# Patient Record
Sex: MALE | Race: WHITE | NOT HISPANIC OR LATINO | Employment: UNEMPLOYED | URBAN - METROPOLITAN AREA
[De-identification: names, ages, dates, MRNs, and addresses within clinical notes are randomized per-mention and may not be internally consistent; named-entity substitution may affect disease eponyms.]

---

## 2024-01-09 ENCOUNTER — HOSPITAL ENCOUNTER (OUTPATIENT)
Dept: RADIOLOGY | Facility: HOSPITAL | Age: 59
Discharge: HOME | End: 2024-01-09
Payer: MEDICARE

## 2024-01-09 DIAGNOSIS — R97.20 ELEVATED PROSTATE SPECIFIC ANTIGEN (PSA): ICD-10-CM

## 2024-01-09 PROCEDURE — A9575 INJ GADOTERATE MEGLUMI 0.1ML: HCPCS | Performed by: UROLOGY

## 2024-01-09 PROCEDURE — 2550000001 HC RX 255 CONTRASTS: Performed by: UROLOGY

## 2024-01-09 PROCEDURE — 72197 MRI PELVIS W/O & W/DYE: CPT

## 2024-01-09 RX ORDER — GADOTERATE MEGLUMINE 376.9 MG/ML
20 INJECTION INTRAVENOUS
Status: COMPLETED | OUTPATIENT
Start: 2024-01-09 | End: 2024-01-09

## 2024-01-09 RX ADMIN — GADOTERATE MEGLUMINE 20 ML: 376.9 INJECTION INTRAVENOUS at 13:24

## 2024-02-02 ENCOUNTER — NURSING HOME VISIT (OUTPATIENT)
Dept: POST ACUTE CARE | Facility: EXTERNAL LOCATION | Age: 59
End: 2024-02-02
Payer: MEDICARE

## 2024-02-02 DIAGNOSIS — E11.9 COMPREHENSIVE DIABETIC FOOT EXAMINATION, TYPE 2 DM, ENCOUNTER FOR (MULTI): Primary | ICD-10-CM

## 2024-02-02 DIAGNOSIS — I73.9 PVD (PERIPHERAL VASCULAR DISEASE) (CMS-HCC): ICD-10-CM

## 2024-02-02 DIAGNOSIS — M79.671 FOOT PAIN, BILATERAL: ICD-10-CM

## 2024-02-02 DIAGNOSIS — M79.672 FOOT PAIN, BILATERAL: ICD-10-CM

## 2024-02-02 PROCEDURE — 99307 SBSQ NF CARE SF MDM 10: CPT | Performed by: PODIATRIST

## 2024-02-02 NOTE — LETTER
Patient: Jaret Turcios  : 1965    Encounter Date: 2024    Subjective: Patient complains of painful nails and unable to safely trim on their own.   Objective: Vascular exam: DP and PT pulses palpable 2/4 b/l.   Capillary refill time less than 3 seconds b/l. + Hair growth noted to digits. No lower extremity swelling noted to b/l legs.   Skin temp warm to warm from proximal to distal b/l.    No Varicosities noted to feet b/l.   Neurologic: Vibratory, protective and proprioception intact b/l.     Musculoskeletal exam: Muscle strength 5/5 for all major muscle groups tested in the lower extremity b/l.     Dermatologic exam: Nails 1-5 b/l are elongated and discolored with the presence of subungual debris. Left 5th nail notes sub ungula - debrided nail and area  Webspaces 1-4 b/l are non macerated.   Hyperkeratotic tissue noted:None  Open wounds noted: Scattered nodules noted to b/l lower extremity, erythema to right dorsal shin. No drainage noted.   Assessment: DM type II, fungal nails, PVD, foot pain  Plan:  Patient examined and evaluated.   Nails debrided - L 5nd nail debrided. Mild erythema noted. No SOI . Continue to monitor  Betadine to area, cotton ball to offload area daily.   Will continue to follow. Call if any concerns.  All questions answered.  Continue current orders      Electronically Signed By: Shannan Lawton DPM   24  1:20 PM

## 2024-02-14 ENCOUNTER — PRE-ADMISSION TESTING (OUTPATIENT)
Dept: PREADMISSION TESTING | Facility: HOSPITAL | Age: 59
End: 2024-02-14
Payer: MEDICARE

## 2024-02-14 ENCOUNTER — LAB (OUTPATIENT)
Dept: LAB | Facility: LAB | Age: 59
End: 2024-02-14
Payer: MEDICARE

## 2024-02-14 VITALS
BODY MASS INDEX: 30.8 KG/M2 | HEIGHT: 74 IN | DIASTOLIC BLOOD PRESSURE: 74 MMHG | TEMPERATURE: 96.6 F | RESPIRATION RATE: 16 BRPM | SYSTOLIC BLOOD PRESSURE: 147 MMHG | WEIGHT: 240 LBS | OXYGEN SATURATION: 99 % | HEART RATE: 65 BPM

## 2024-02-14 DIAGNOSIS — Z01.818 PREOPERATIVE TESTING: Primary | ICD-10-CM

## 2024-02-14 DIAGNOSIS — Z01.818 PREOPERATIVE TESTING: ICD-10-CM

## 2024-02-14 LAB
ANION GAP SERPL CALC-SCNC: 10 MMOL/L (ref 10–20)
BUN SERPL-MCNC: 17 MG/DL (ref 6–23)
CALCIUM SERPL-MCNC: 9.6 MG/DL (ref 8.6–10.3)
CHLORIDE SERPL-SCNC: 101 MMOL/L (ref 98–107)
CO2 SERPL-SCNC: 32 MMOL/L (ref 21–32)
CREAT SERPL-MCNC: 1.03 MG/DL (ref 0.5–1.3)
EGFRCR SERPLBLD CKD-EPI 2021: 84 ML/MIN/1.73M*2
ERYTHROCYTE [DISTWIDTH] IN BLOOD BY AUTOMATED COUNT: 12 % (ref 11.5–14.5)
GLUCOSE SERPL-MCNC: 88 MG/DL (ref 74–99)
HCT VFR BLD AUTO: 48.2 % (ref 41–52)
HGB BLD-MCNC: 16.3 G/DL (ref 13.5–17.5)
MCH RBC QN AUTO: 31.8 PG (ref 26–34)
MCHC RBC AUTO-ENTMCNC: 33.8 G/DL (ref 32–36)
MCV RBC AUTO: 94 FL (ref 80–100)
NRBC BLD-RTO: 0 /100 WBCS (ref 0–0)
PLATELET # BLD AUTO: 222 X10*3/UL (ref 150–450)
POTASSIUM SERPL-SCNC: 4.1 MMOL/L (ref 3.5–5.3)
RBC # BLD AUTO: 5.12 X10*6/UL (ref 4.5–5.9)
SODIUM SERPL-SCNC: 139 MMOL/L (ref 136–145)
WBC # BLD AUTO: 7.2 X10*3/UL (ref 4.4–11.3)

## 2024-02-14 PROCEDURE — 93005 ELECTROCARDIOGRAM TRACING: CPT

## 2024-02-14 PROCEDURE — 80048 BASIC METABOLIC PNL TOTAL CA: CPT

## 2024-02-14 PROCEDURE — 93010 ELECTROCARDIOGRAM REPORT: CPT | Performed by: INTERNAL MEDICINE

## 2024-02-14 PROCEDURE — 99203 OFFICE O/P NEW LOW 30 MIN: CPT | Performed by: NURSE PRACTITIONER

## 2024-02-14 PROCEDURE — 85027 COMPLETE CBC AUTOMATED: CPT

## 2024-02-14 RX ORDER — ONDANSETRON 4 MG/1
4 TABLET, FILM COATED ORAL 4 TIMES DAILY PRN
COMMUNITY

## 2024-02-14 RX ORDER — BISACODYL 10 MG/1
10 SUPPOSITORY RECTAL DAILY PRN
COMMUNITY

## 2024-02-14 RX ORDER — GLYBURIDE-METFORMIN HYDROCHLORIDE 5; 500 MG/1; MG/1
2 TABLET ORAL EVERY EVENING
COMMUNITY
Start: 2023-05-31

## 2024-02-14 RX ORDER — ADHESIVE BANDAGE
30 BANDAGE TOPICAL DAILY PRN
COMMUNITY

## 2024-02-14 RX ORDER — DIPHENHYDRAMINE HCL 25 MG
25 CAPSULE ORAL EVERY 6 HOURS PRN
COMMUNITY

## 2024-02-14 RX ORDER — GLYBURIDE-METFORMIN HYDROCHLORIDE 5; 500 MG/1; MG/1
1 TABLET ORAL
COMMUNITY

## 2024-02-14 RX ORDER — ACETAMINOPHEN 160 MG/5ML
20 LIQUID ORAL EVERY 6 HOURS PRN
COMMUNITY

## 2024-02-14 RX ORDER — PHENOL 1.4 %
1 AEROSOL, SPRAY (ML) MUCOUS MEMBRANE EVERY 2 HOUR PRN
COMMUNITY

## 2024-02-14 RX ORDER — ACETAMINOPHEN 500 MG
1000 TABLET ORAL EVERY 6 HOURS PRN
COMMUNITY

## 2024-02-14 RX ORDER — MENTHOL AND ZINC OXIDE .44; 20.625 G/100G; G/100G
1 OINTMENT TOPICAL 4 TIMES DAILY PRN
COMMUNITY

## 2024-02-14 RX ORDER — LOPERAMIDE HCL 2 MG
4 TABLET ORAL 4 TIMES DAILY PRN
COMMUNITY

## 2024-02-14 RX ORDER — MIRABEGRON 50 MG/1
50 TABLET, EXTENDED RELEASE ORAL DAILY
COMMUNITY

## 2024-02-14 ASSESSMENT — DUKE ACTIVITY SCORE INDEX (DASI)
CAN YOU TAKE CARE OF YOURSELF (EAT, DRESS, BATHE, OR USE TOILET): YES
CAN YOU RUN A SHORT DISTANCE: YES
CAN YOU WALK INDOORS, SUCH AS AROUND YOUR HOUSE: YES
CAN YOU DO HEAVY WORK AROUND THE HOUSE LIKE SCRUBBING FLOORS OR LIFTING AND MOVING HEAVY FURNITURE: YES
CAN YOU DO LIGHT WORK AROUND THE HOUSE LIKE DUSTING OR WASHING DISHES: YES
CAN YOU DO YARD WORK LIKE RAKING LEAVES, WEEDING OR PUSHING A MOWER: YES
CAN YOU PARTICIPATE IN STRENOUS SPORTS LIKE SWIMMING, SINGLES TENNIS, FOOTBALL, BASKETBALL, OR SKIING: NO
CAN YOU CLIMB A FLIGHT OF STAIRS OR WALK UP A HILL: YES
CAN YOU DO MODERATE WORK AROUND THE HOUSE LIKE VACUUMING, SWEEPING FLOORS OR CARRYING GROCERIES: YES
CAN YOU HAVE SEXUAL RELATIONS: NO
TOTAL_SCORE: 45.45
CAN YOU PARTICIPATE IN MODERATE RECREATIONAL ACTIVITIES LIKE GOLF, BOWLING, DANCING, DOUBLES TENNIS OR THROWING A BASEBALL OR FOOTBALL: YES
CAN YOU WALK A BLOCK OR TWO ON LEVEL GROUND: YES
DASI METS SCORE: 8.3

## 2024-02-14 ASSESSMENT — ENCOUNTER SYMPTOMS
CARDIOVASCULAR NEGATIVE: 1
EYES NEGATIVE: 1
RESPIRATORY NEGATIVE: 1
MUSCULOSKELETAL NEGATIVE: 1
ENDOCRINE NEGATIVE: 1
CONSTITUTIONAL NEGATIVE: 1
GASTROINTESTINAL NEGATIVE: 1
FREQUENCY: 1
PSYCHIATRIC NEGATIVE: 1
TREMORS: 1

## 2024-02-14 ASSESSMENT — PAIN - FUNCTIONAL ASSESSMENT: PAIN_FUNCTIONAL_ASSESSMENT: 0-10

## 2024-02-14 ASSESSMENT — CHADS2 SCORE
AGE GREATER THAN OR EQUAL TO 75: NO
PRIOR STROKE OR TIA OR THROMBOEMBOLISM: NO
HYPERTENSION: NO
CHF: NO
DIABETES: YES
CHADS2 SCORE: 1
AGE GREATER THAN OR EQUAL TO 75: NO

## 2024-02-14 ASSESSMENT — PAIN SCALES - GENERAL: PAINLEVEL_OUTOF10: 0 - NO PAIN

## 2024-02-14 NOTE — CPM/PAT H&P
CPM/PAT Evaluation       Name: Jaret Turcios (Jaret Turcios)  /Age: 10/29//58 y.o.     In-Person       Chief Complaint: ELEVATED PSA     HPI  A 58-year-old male with elevated PSA here with his caregiver nurse (Jodi). Patient has intellectual disability and currently resides at [facility] (POA) is brother Geoff.  Patients medical history significant for diabetes type 2, severe sleep apnea, urinary incontinence.  History of urinary incontinence, elevated PSA and recent abnormal prostate MRI.  Denies fever, chills, dysuria, or hematuria.  He is scheduled for prostate biopsy with navigation, transrectal prostate ultrasonography.    Past Medical History:   Diagnosis Date    Cataract     DM (diabetes mellitus) (CMS/[facility])     Intellectual disability     ISHAN (obstructive sleep apnea)     Urinary incontinence        Past Surgical History:   Procedure Laterality Date    CHOLECYSTECTOMY           No Known Allergies    Current Outpatient Medications   Medication Sig Dispense Refill    glyburide-metformin (Glucovance) 5-500 mg tablet Take 2 tablets by mouth once daily in the evening.      SITagliptin phosphate (Januvia) 50 mg tablet Take 1 tablet (50 mg) by mouth once daily.      acetaminophen (Tylenol) 160 mg/5 mL elixir Take 20 mL (640 mg) by mouth every 6 hours if needed for fever (temp greater than 38.0 C).      acetaminophen (Tylenol) 500 mg tablet Take 2 tablets (1,000 mg) by mouth every 6 hours if needed for mild pain (1 - 3).      aluminum-magnesium hydroxide 200-200 mg/5 mL suspension Take 20 mL by mouth every 6 hours if needed for heartburn.      bisacodyl (Laxative, bisacodyl,) 10 mg suppository Insert 1 suppository (10 mg) into the rectum once daily as needed for constipation.      diphenhydrAMINE (BENADryl) 25 mg capsule Take 1 capsule (25 mg) by mouth every 6 hours if needed for itching.      glyburide-metformin (Glucovance) 5-500 mg tablet Take 1 tablet by mouth once daily with breakfast.       guaifenesin/dextromethorphan (TUSSIN DM CLEAR ORAL) Take 10 mL by mouth 4 times a day as needed (COLD SYMPTOMS).      guaifenesin/DM/pseudoephedrine (TUSSIN CF ORAL) Take 10 mL by mouth 4 times a day as needed (COLD SYMPTOMS).      loperamide (Imodium A-D) 2 mg tablet Take 2 tablets (4 mg) by mouth 4 times a day as needed for diarrhea.      magnesium hydroxide (Ortega Milk of Magnesia) 400 mg/5 mL suspension Take 30 mL by mouth once daily as needed for constipation.      menthol-zinc oxide (Calmoseptine) 0.44-20.6 % ointment Apply 1 Application topically 4 times a day as needed for irritation.      mirabegron (Myrbetriq) 50 mg tablet extended release 24 hr 24 hr tablet Take 1 tablet (50 mg) by mouth once daily.      ondansetron (Zofran) 4 mg tablet Take 1 tablet (4 mg) by mouth 4 times a day as needed for nausea or vomiting.      phenoL (Chloraseptic Throat Spray) 1.4 % aerosol,spray Use 1 spray in the mouth or throat every 2 hours if needed for sore throat.      vit C/E/Zn/coppr/lutein/zeaxan (PRESERVISION AREDS-2 ORAL) Take 2 capsules by mouth 2 times a day.       No current facility-administered medications for this visit.       Review of Systems   Constitutional: Negative.    HENT: Negative.     Eyes: Negative.    Respiratory: Negative.     Cardiovascular: Negative.    Gastrointestinal: Negative.    Endocrine: Negative.    Genitourinary:  Positive for frequency.   Musculoskeletal: Negative.    Skin: Negative.    Neurological:  Positive for tremors.   Psychiatric/Behavioral: Negative.          Physical Exam  Vitals reviewed.   HENT:      Head: Normocephalic and atraumatic.      Ears:      Comments: Bilateral hearing aids     Mouth/Throat:      Mouth: Mucous membranes are moist.   Eyes:      Pupils: Pupils are equal, round, and reactive to light.      Comments: glasses   Cardiovascular:      Rate and Rhythm: Normal rate and regular rhythm.   Pulmonary:      Effort: Pulmonary effort is normal.      Breath sounds:  "Normal breath sounds.   Abdominal:      Palpations: Abdomen is soft.   Musculoskeletal:         General: Normal range of motion.      Cervical back: Normal range of motion.   Skin:     General: Skin is warm and dry.   Neurological:      Mental Status: He is alert and oriented to person, place, and time.      Comments: Intellectual disability   Psychiatric:         Mood and Affect: Mood normal.          PAT AIRWAY:   Airway:     Mallampati::  II    Neck ROM::  Full  normal        /74   Pulse 65   Temp 35.9 °C (96.6 °F) (Temporal)   Resp 16   Ht 1.88 m (6' 2\")   Wt 109 kg (240 lb)   SpO2 99%   BMI 30.81 kg/m²     Labs done 11/29/2023 BMP, Hgb A1c, psa  Labs ordered CBC, BMP      Stop Bang Score 4     CHADS 2 score: 2.8%  DASI score: 45.45  METS score: 8.3  Revised cardiac risk index: 0.4%  ASA II  ARISCAT 1.6%    Assessment and Plan:      ELEVATED PSA Plan: Prostate biopsy with navigation, transrectal prostate ultrasonography  Diabetes type 2 hemoglobin A1c 6.1 done 11/29/2023   Intellectual disability (POA) Brother Geoff  Urinary incontinence  Sleep apnea severe uses CPAP  BMI 30.81        "

## 2024-02-14 NOTE — H&P (VIEW-ONLY)
CPM/PAT Evaluation       Name: Jaret Turcios (Jaret Turcios)  /Age: 10/29//58 y.o.     In-Person       Chief Complaint: ELEVATED PSA     HPI  A 58-year-old male with elevated PSA here with his caregiver nurse (Jodi). Patient has intellectual disability and currently resides at [facility] (POA) is brother Geoff.  Patients medical history significant for diabetes type 2, severe sleep apnea, urinary incontinence.  History of urinary incontinence, elevated PSA and recent abnormal prostate MRI.  Denies fever, chills, dysuria, or hematuria.  He is scheduled for prostate biopsy with navigation, transrectal prostate ultrasonography.    Past Medical History:   Diagnosis Date    Cataract     DM (diabetes mellitus) (CMS/[facility])     Intellectual disability     ISHAN (obstructive sleep apnea)     Urinary incontinence        Past Surgical History:   Procedure Laterality Date    CHOLECYSTECTOMY           No Known Allergies    Current Outpatient Medications   Medication Sig Dispense Refill    glyburide-metformin (Glucovance) 5-500 mg tablet Take 2 tablets by mouth once daily in the evening.      SITagliptin phosphate (Januvia) 50 mg tablet Take 1 tablet (50 mg) by mouth once daily.      acetaminophen (Tylenol) 160 mg/5 mL elixir Take 20 mL (640 mg) by mouth every 6 hours if needed for fever (temp greater than 38.0 C).      acetaminophen (Tylenol) 500 mg tablet Take 2 tablets (1,000 mg) by mouth every 6 hours if needed for mild pain (1 - 3).      aluminum-magnesium hydroxide 200-200 mg/5 mL suspension Take 20 mL by mouth every 6 hours if needed for heartburn.      bisacodyl (Laxative, bisacodyl,) 10 mg suppository Insert 1 suppository (10 mg) into the rectum once daily as needed for constipation.      diphenhydrAMINE (BENADryl) 25 mg capsule Take 1 capsule (25 mg) by mouth every 6 hours if needed for itching.      glyburide-metformin (Glucovance) 5-500 mg tablet Take 1 tablet by mouth once daily with breakfast.       guaifenesin/dextromethorphan (TUSSIN DM CLEAR ORAL) Take 10 mL by mouth 4 times a day as needed (COLD SYMPTOMS).      guaifenesin/DM/pseudoephedrine (TUSSIN CF ORAL) Take 10 mL by mouth 4 times a day as needed (COLD SYMPTOMS).      loperamide (Imodium A-D) 2 mg tablet Take 2 tablets (4 mg) by mouth 4 times a day as needed for diarrhea.      magnesium hydroxide (Ortega Milk of Magnesia) 400 mg/5 mL suspension Take 30 mL by mouth once daily as needed for constipation.      menthol-zinc oxide (Calmoseptine) 0.44-20.6 % ointment Apply 1 Application topically 4 times a day as needed for irritation.      mirabegron (Myrbetriq) 50 mg tablet extended release 24 hr 24 hr tablet Take 1 tablet (50 mg) by mouth once daily.      ondansetron (Zofran) 4 mg tablet Take 1 tablet (4 mg) by mouth 4 times a day as needed for nausea or vomiting.      phenoL (Chloraseptic Throat Spray) 1.4 % aerosol,spray Use 1 spray in the mouth or throat every 2 hours if needed for sore throat.      vit C/E/Zn/coppr/lutein/zeaxan (PRESERVISION AREDS-2 ORAL) Take 2 capsules by mouth 2 times a day.       No current facility-administered medications for this visit.       Review of Systems   Constitutional: Negative.    HENT: Negative.     Eyes: Negative.    Respiratory: Negative.     Cardiovascular: Negative.    Gastrointestinal: Negative.    Endocrine: Negative.    Genitourinary:  Positive for frequency.   Musculoskeletal: Negative.    Skin: Negative.    Neurological:  Positive for tremors.   Psychiatric/Behavioral: Negative.          Physical Exam  Vitals reviewed.   HENT:      Head: Normocephalic and atraumatic.      Ears:      Comments: Bilateral hearing aids     Mouth/Throat:      Mouth: Mucous membranes are moist.   Eyes:      Pupils: Pupils are equal, round, and reactive to light.      Comments: glasses   Cardiovascular:      Rate and Rhythm: Normal rate and regular rhythm.   Pulmonary:      Effort: Pulmonary effort is normal.      Breath sounds:  "Normal breath sounds.   Abdominal:      Palpations: Abdomen is soft.   Musculoskeletal:         General: Normal range of motion.      Cervical back: Normal range of motion.   Skin:     General: Skin is warm and dry.   Neurological:      Mental Status: He is alert and oriented to person, place, and time.      Comments: Intellectual disability   Psychiatric:         Mood and Affect: Mood normal.          PAT AIRWAY:   Airway:     Mallampati::  II    Neck ROM::  Full  normal        /74   Pulse 65   Temp 35.9 °C (96.6 °F) (Temporal)   Resp 16   Ht 1.88 m (6' 2\")   Wt 109 kg (240 lb)   SpO2 99%   BMI 30.81 kg/m²     Labs done 11/29/2023 BMP, Hgb A1c, psa  Labs ordered CBC, BMP      Stop Bang Score 4     CHADS 2 score: 2.8%  DASI score: 45.45  METS score: 8.3  Revised cardiac risk index: 0.4%  ASA II  ARISCAT 1.6%    Assessment and Plan:      ELEVATED PSA Plan: Prostate biopsy with navigation, transrectal prostate ultrasonography  Diabetes type 2 hemoglobin A1c 6.1 done 11/29/2023   Intellectual disability (POA) Brother Geoff  Urinary incontinence  Sleep apnea severe uses CPAP  BMI 30.81        "

## 2024-02-14 NOTE — PREPROCEDURE INSTRUCTIONS
PAT DISCHARGE INSTRUCTIONS    Please call the Same Day Surgery (SDS) Department of the hospital where your procedure will be performed after 2:00 PM the day before your surgery. If you are scheduled on a Monday, or a Tuesday following a Monday holiday, you will need to call on the last business day prior to your surgery.    [facility]  [address], [address]  [phone]    [facility]  [address] [place] [address] [phone]    [facility]  [address]  [place] [id number]  [phone]    Please let your surgeon know if:      You develop any open sores, shingles, burning or painful urination as these may increase your risk of an infection.   You no longer wish to have the surgery.   Any other personal circumstances change that may lead to the need to cancel or defer this surgery-such as being sick or getting admitted to any hospital within one week of your planned procedure.    Your contact details change, such as a change of address or phone number.    Starting now:     Please DO NOT drink alcohol or smoke for 24 hours before surgery. It is well known that quitting smoking can make a huge difference to your health and recovery from surgery. The longer you abstain from smoking, the better your chances of a healthy recovery. If you need help with quitting, call 4-800-QUIT-NOW to be connected to a trained counselor who will discuss the best methods to help you quit.     Before your surgery:    Please stop all supplements 7 days prior to surgery. Or as directed by your surgeon.   Please stop taking NSAID pain medicine such as Advil and Motrin 7 days before surgery.    If you develop any fever, cough, cold, rashes, cuts, scratches, scrapes, urinary symptoms or infection anywhere on your body (including teeth and gums) prior to surgery, please call your surgeon’s office as soon  as possible. This may require treatment to reduce the chance of cancellation on the day of surgery.    The day before your surgery:   DIET- Do not eat or drink anything after midnight the night before surgery, including mints, candy and gum.   Get a good night’s rest.  Use the special soap for bathing if you have been instructed to use one.    Scheduled surgery times may change and you will be notified if this occurs - please check your personal voicemail for any updates.     On the morning of surgery:   Wear comfortable, loose fitting clothes which open in the front. Please do not wear moisturizers, creams, lotions, makeup or perfume.    Please bring with you to surgery:   Photo ID and insurance card   Current list of medicines and allergies   Pacemaker/ Defibrillator/Heart stent cards   CPAP machine and mask    Slings/ splints/ crutches   A copy of your complete advanced directive/DHPOA.    Please do NOT bring with you to surgery:   All jewelry and valuables should be left at home.   Prosthetic devices such as contact lenses, hearing aids, dentures, eyelash extensions, hairpins and body piercings must be removed prior to going in to the surgical suite.    After outpatient surgery:   A responsible adult MUST accompany you at the time of discharge and stay with you for 24 hours after your surgery. You may NOT drive yourself home after surgery.    Do not drive, operate machinery, make critical decisions or do activities that require co-ordination or balance until after a night’s sleep.   Do not drink alcoholic beverages for 24 hours.   Instructions for resuming your medications will be provided by your surgeon.    CALL YOUR DOCTOR AFTER SURGERY IF YOU HAVE:     Chills and/or a fever of 101° F or higher.    Redness, swelling, pus or drainage from your surgical wound or a bad smell from the wound.    Lightheadedness, fainting or confusion.    Persistent vomiting (throwing up) and are not able to eat or drink for 12  hours.    Three or more loose, watery bowel movements in 24 hours (diarrhea).   Difficulty or pain while urinating( after non-urological surgery)    Pain and swelling in your legs, especially if it is only on one side.    Difficulty breathing or are breathing faster than normal.    Any new concerning symptoms.     Medication List            Accurate as of February 14, 2024  2:29 PM. Always use your most recent med list.                * acetaminophen 500 mg tablet  Commonly known as: Tylenol  Notes to patient: Take as needed.     * acetaminophen 160 mg/5 mL elixir  Commonly known as: Tylenol  Notes to patient: Take as needed.     aluminum-magnesium hydroxide 200-200 mg/5 mL suspension  Notes to patient: Take as needed.     Calmoseptine 0.44-20.6 % ointment  Generic drug: menthol-zinc oxide  Notes to patient: Take as needed.     Chloraseptic Throat Spray 1.4 % aerosol,spray  Generic drug: phenoL  Notes to patient: Take as needed.     diphenhydrAMINE 25 mg capsule  Commonly known as: BENADryl  Notes to patient: Take as needed.     * glyburide-metformin 5-500 mg tablet  Commonly known as: Glucovance  Notes to patient: Hold dose day of surgery.     * glyburide-metformin 5-500 mg tablet  Commonly known as: Glucovance  Notes to patient: Hold night before surgery per anesthesia guidelines     Laxative (bisacodyl) 10 mg suppository  Generic drug: bisacodyl  Notes to patient: Take as needed.     loperamide 2 mg tablet  Commonly known as: Imodium A-D  Notes to patient: Take as needed.     mirabegron 50 mg tablet extended release 24 hr 24 hr tablet  Commonly known as: Myrbetriq Hold day of surgery     ondansetron 4 mg tablet  Commonly known as: Zofran  Medication Adjustments for Surgery:  As needed     Ortega Milk of Magnesia 400 mg/5 mL suspension  Generic drug: magnesium hydroxide  Notes to patient: Take as needed.     PRESERVISION AREDS-2 ORAL  Medication Adjustments for Surgery: Stop 7 days before surgery     SITagliptin  phosphate 50 mg tablet  Commonly known as: Zenuvia  Notes to patient: Hold night before and morning of surgery     TUSSIN CF ORAL  Notes to patient: Take as needed.     TUSSIN DM CLEAR ORAL  Notes to patient: Take as needed.           * This list has 4 medication(s) that are the same as other medications prescribed for you. Read the directions carefully, and ask your doctor or other care provider to review them with you.

## 2024-02-14 NOTE — PERIOPERATIVE NURSING NOTE
Patent seen in PAT today with Jodi from facility. Consent for treatment obtained by registration. Asked for Jodi to bring guardianship form day of surgery to scan to record. States that his guardian is bringing him that day. Med list and instructions reviewed with patient and Jodi.

## 2024-02-16 LAB
ATRIAL RATE: 61 BPM
P AXIS: 53 DEGREES
P OFFSET: 209 MS
P ONSET: 151 MS
PR INTERVAL: 144 MS
Q ONSET: 223 MS
QRS COUNT: 10 BEATS
QRS DURATION: 148 MS
QT INTERVAL: 442 MS
QTC CALCULATION(BAZETT): 444 MS
QTC FREDERICIA: 444 MS
R AXIS: -6 DEGREES
T AXIS: 29 DEGREES
T OFFSET: 444 MS
VENTRICULAR RATE: 61 BPM

## 2024-02-19 NOTE — PROGRESS NOTES
Subjective: Patient complains of painful nails and unable to safely trim on their own.   Objective: Vascular exam: DP and PT pulses palpable 2/4 b/l.   Capillary refill time less than 3 seconds b/l. + Hair growth noted to digits. No lower extremity swelling noted to b/l legs.   Skin temp warm to warm from proximal to distal b/l.    No Varicosities noted to feet b/l.   Neurologic: Vibratory, protective and proprioception intact b/l.     Musculoskeletal exam: Muscle strength 5/5 for all major muscle groups tested in the lower extremity b/l.     Dermatologic exam: Nails 1-5 b/l are elongated and discolored with the presence of subungual debris. Left 5th nail notes sub ungula - debrided nail and area  Webspaces 1-4 b/l are non macerated.   Hyperkeratotic tissue noted:None  Open wounds noted: Scattered nodules noted to b/l lower extremity, erythema to right dorsal shin. No drainage noted.   Assessment: DM type II, fungal nails, PVD, foot pain  Plan:  Patient examined and evaluated.   Nails debrided - L 5nd nail debrided. Mild erythema noted. No SOI . Continue to monitor  Betadine to area, cotton ball to offload area daily.   Will continue to follow. Call if any concerns.  All questions answered.  Continue current orders

## 2024-02-21 ENCOUNTER — ANESTHESIA EVENT (OUTPATIENT)
Dept: OPERATING ROOM | Facility: HOSPITAL | Age: 59
End: 2024-02-21
Payer: MEDICARE

## 2024-02-23 ENCOUNTER — ANESTHESIA (OUTPATIENT)
Dept: OPERATING ROOM | Facility: HOSPITAL | Age: 59
End: 2024-02-23
Payer: MEDICARE

## 2024-02-23 ENCOUNTER — HOSPITAL ENCOUNTER (OUTPATIENT)
Facility: HOSPITAL | Age: 59
Setting detail: OUTPATIENT SURGERY
Discharge: HOME | End: 2024-02-23
Attending: UROLOGY | Admitting: UROLOGY
Payer: MEDICARE

## 2024-02-23 VITALS
HEART RATE: 63 BPM | HEIGHT: 74 IN | BODY MASS INDEX: 30.27 KG/M2 | TEMPERATURE: 97.3 F | DIASTOLIC BLOOD PRESSURE: 89 MMHG | RESPIRATION RATE: 18 BRPM | SYSTOLIC BLOOD PRESSURE: 144 MMHG | WEIGHT: 235.89 LBS | OXYGEN SATURATION: 98 %

## 2024-02-23 DIAGNOSIS — R97.20 ELEVATED PSA: ICD-10-CM

## 2024-02-23 LAB — GLUCOSE BLD MANUAL STRIP-MCNC: 184 MG/DL (ref 74–99)

## 2024-02-23 PROCEDURE — 88344 IMHCHEM/IMCYTCHM EA MLT ANTB: CPT | Performed by: PATHOLOGY

## 2024-02-23 PROCEDURE — 7100000002 HC RECOVERY ROOM TIME - EACH INCREMENTAL 1 MINUTE: Performed by: UROLOGY

## 2024-02-23 PROCEDURE — A55700 PR BIOPSY OF PROSTATE,NEEDLE/PUNCH: Performed by: STUDENT IN AN ORGANIZED HEALTH CARE EDUCATION/TRAINING PROGRAM

## 2024-02-23 PROCEDURE — 2500000004 HC RX 250 GENERAL PHARMACY W/ HCPCS (ALT 636 FOR OP/ED): Performed by: UROLOGY

## 2024-02-23 PROCEDURE — 82947 ASSAY GLUCOSE BLOOD QUANT: CPT

## 2024-02-23 PROCEDURE — 3600000009 HC OR TIME - EACH INCREMENTAL 1 MINUTE - PROCEDURE LEVEL FOUR: Performed by: UROLOGY

## 2024-02-23 PROCEDURE — G0416 PROSTATE BIOPSY, ANY MTHD: HCPCS | Performed by: PATHOLOGY

## 2024-02-23 PROCEDURE — 7100000001 HC RECOVERY ROOM TIME - INITIAL BASE CHARGE: Performed by: UROLOGY

## 2024-02-23 PROCEDURE — 88344 IMHCHEM/IMCYTCHM EA MLT ANTB: CPT | Mod: TC,SUR,BEALAB,WESLAB,MUE | Performed by: UROLOGY

## 2024-02-23 PROCEDURE — 3700000002 HC GENERAL ANESTHESIA TIME - EACH INCREMENTAL 1 MINUTE: Performed by: UROLOGY

## 2024-02-23 PROCEDURE — 2500000004 HC RX 250 GENERAL PHARMACY W/ HCPCS (ALT 636 FOR OP/ED): Performed by: ANESTHESIOLOGIST ASSISTANT

## 2024-02-23 PROCEDURE — 3700000001 HC GENERAL ANESTHESIA TIME - INITIAL BASE CHARGE: Performed by: UROLOGY

## 2024-02-23 PROCEDURE — 3600000004 HC OR TIME - INITIAL BASE CHARGE - PROCEDURE LEVEL FOUR: Performed by: UROLOGY

## 2024-02-23 PROCEDURE — 2720000007 HC OR 272 NO HCPCS: Performed by: UROLOGY

## 2024-02-23 PROCEDURE — 7100000010 HC PHASE TWO TIME - EACH INCREMENTAL 1 MINUTE: Performed by: UROLOGY

## 2024-02-23 PROCEDURE — C1819 TISSUE LOCALIZATION-EXCISION: HCPCS | Performed by: UROLOGY

## 2024-02-23 PROCEDURE — A55700 PR BIOPSY OF PROSTATE,NEEDLE/PUNCH: Performed by: ANESTHESIOLOGIST ASSISTANT

## 2024-02-23 PROCEDURE — 7100000009 HC PHASE TWO TIME - INITIAL BASE CHARGE: Performed by: UROLOGY

## 2024-02-23 RX ORDER — OXYCODONE HYDROCHLORIDE 5 MG/1
5 TABLET ORAL EVERY 4 HOURS PRN
Status: DISCONTINUED | OUTPATIENT
Start: 2024-02-23 | End: 2024-02-23 | Stop reason: HOSPADM

## 2024-02-23 RX ORDER — SODIUM CHLORIDE, SODIUM LACTATE, POTASSIUM CHLORIDE, CALCIUM CHLORIDE 600; 310; 30; 20 MG/100ML; MG/100ML; MG/100ML; MG/100ML
100 INJECTION, SOLUTION INTRAVENOUS CONTINUOUS
Status: DISCONTINUED | OUTPATIENT
Start: 2024-02-23 | End: 2024-02-23 | Stop reason: HOSPADM

## 2024-02-23 RX ORDER — CIPROFLOXACIN 500 MG/1
500 TABLET ORAL 2 TIMES DAILY
Qty: 4 TABLET | Refills: 0 | Status: SHIPPED | OUTPATIENT
Start: 2024-02-23

## 2024-02-23 RX ORDER — ONDANSETRON HYDROCHLORIDE 2 MG/ML
INJECTION, SOLUTION INTRAVENOUS AS NEEDED
Status: DISCONTINUED | OUTPATIENT
Start: 2024-02-23 | End: 2024-02-23

## 2024-02-23 RX ORDER — PROPOFOL 10 MG/ML
INJECTION, EMULSION INTRAVENOUS CONTINUOUS PRN
Status: DISCONTINUED | OUTPATIENT
Start: 2024-02-23 | End: 2024-02-23

## 2024-02-23 RX ORDER — ONDANSETRON HYDROCHLORIDE 2 MG/ML
4 INJECTION, SOLUTION INTRAVENOUS ONCE AS NEEDED
Status: DISCONTINUED | OUTPATIENT
Start: 2024-02-23 | End: 2024-02-23 | Stop reason: HOSPADM

## 2024-02-23 RX ORDER — LABETALOL HYDROCHLORIDE 5 MG/ML
5 INJECTION, SOLUTION INTRAVENOUS ONCE AS NEEDED
Status: DISCONTINUED | OUTPATIENT
Start: 2024-02-23 | End: 2024-02-23 | Stop reason: HOSPADM

## 2024-02-23 RX ORDER — PROPOFOL 10 MG/ML
INJECTION, EMULSION INTRAVENOUS AS NEEDED
Status: DISCONTINUED | OUTPATIENT
Start: 2024-02-23 | End: 2024-02-23

## 2024-02-23 RX ORDER — LIDOCAINE HYDROCHLORIDE 10 MG/ML
0.1 INJECTION INFILTRATION; PERINEURAL ONCE
Status: DISCONTINUED | OUTPATIENT
Start: 2024-02-23 | End: 2024-02-23 | Stop reason: HOSPADM

## 2024-02-23 RX ORDER — FENTANYL CITRATE 50 UG/ML
INJECTION, SOLUTION INTRAMUSCULAR; INTRAVENOUS AS NEEDED
Status: DISCONTINUED | OUTPATIENT
Start: 2024-02-23 | End: 2024-02-23

## 2024-02-23 RX ORDER — CEFTRIAXONE 2 G/50ML
2 INJECTION, SOLUTION INTRAVENOUS ONCE
Status: COMPLETED | OUTPATIENT
Start: 2024-02-23 | End: 2024-02-23

## 2024-02-23 RX ORDER — SODIUM CHLORIDE, SODIUM LACTATE, POTASSIUM CHLORIDE, CALCIUM CHLORIDE 600; 310; 30; 20 MG/100ML; MG/100ML; MG/100ML; MG/100ML
INJECTION, SOLUTION INTRAVENOUS CONTINUOUS PRN
Status: DISCONTINUED | OUTPATIENT
Start: 2024-02-23 | End: 2024-02-23

## 2024-02-23 RX ORDER — MIDAZOLAM HYDROCHLORIDE 1 MG/ML
INJECTION, SOLUTION INTRAMUSCULAR; INTRAVENOUS AS NEEDED
Status: DISCONTINUED | OUTPATIENT
Start: 2024-02-23 | End: 2024-02-23

## 2024-02-23 RX ORDER — DEXAMETHASONE SODIUM PHOSPHATE 4 MG/ML
INJECTION, SOLUTION INTRA-ARTICULAR; INTRALESIONAL; INTRAMUSCULAR; INTRAVENOUS; SOFT TISSUE AS NEEDED
Status: DISCONTINUED | OUTPATIENT
Start: 2024-02-23 | End: 2024-02-23

## 2024-02-23 RX ORDER — DIPHENHYDRAMINE HYDROCHLORIDE 50 MG/ML
12.5 INJECTION INTRAMUSCULAR; INTRAVENOUS ONCE AS NEEDED
Status: DISCONTINUED | OUTPATIENT
Start: 2024-02-23 | End: 2024-02-23 | Stop reason: HOSPADM

## 2024-02-23 RX ADMIN — DEXAMETHASONE SODIUM PHOSPHATE 4 MG: 4 INJECTION, SOLUTION INTRAMUSCULAR; INTRAVENOUS at 07:56

## 2024-02-23 RX ADMIN — ONDANSETRON 4 MG: 2 INJECTION INTRAMUSCULAR; INTRAVENOUS at 07:56

## 2024-02-23 RX ADMIN — PROPOFOL 75 MCG/KG/MIN: 10 INJECTION, EMULSION INTRAVENOUS at 07:54

## 2024-02-23 RX ADMIN — PROPOFOL 60 MG: 10 INJECTION, EMULSION INTRAVENOUS at 07:54

## 2024-02-23 RX ADMIN — MIDAZOLAM 2 MG: 1 INJECTION INTRAMUSCULAR; INTRAVENOUS at 07:48

## 2024-02-23 RX ADMIN — SODIUM CHLORIDE, POTASSIUM CHLORIDE, SODIUM LACTATE AND CALCIUM CHLORIDE: 600; 310; 30; 20 INJECTION, SOLUTION INTRAVENOUS at 07:48

## 2024-02-23 RX ADMIN — FENTANYL CITRATE 25 MCG: 0.05 INJECTION, SOLUTION INTRAMUSCULAR; INTRAVENOUS at 07:54

## 2024-02-23 RX ADMIN — CEFTRIAXONE SODIUM 2 G: 2 INJECTION, SOLUTION INTRAVENOUS at 07:56

## 2024-02-23 RX ADMIN — PROPOFOL 20 MG: 10 INJECTION, EMULSION INTRAVENOUS at 08:09

## 2024-02-23 ASSESSMENT — PAIN - FUNCTIONAL ASSESSMENT
PAIN_FUNCTIONAL_ASSESSMENT: 0-10

## 2024-02-23 ASSESSMENT — COLUMBIA-SUICIDE SEVERITY RATING SCALE - C-SSRS
6. HAVE YOU EVER DONE ANYTHING, STARTED TO DO ANYTHING, OR PREPARED TO DO ANYTHING TO END YOUR LIFE?: NO
1. IN THE PAST MONTH, HAVE YOU WISHED YOU WERE DEAD OR WISHED YOU COULD GO TO SLEEP AND NOT WAKE UP?: NO
2. HAVE YOU ACTUALLY HAD ANY THOUGHTS OF KILLING YOURSELF?: NO

## 2024-02-23 ASSESSMENT — PAIN SCALES - GENERAL
PAINLEVEL_OUTOF10: 0 - NO PAIN

## 2024-02-23 NOTE — ANESTHESIA PREPROCEDURE EVALUATION
Patient: Jaret Turcios    Procedure Information       Date/Time: 02/23/24 0745    Procedures:       Biopsy Prostate with Navigation      Ultrasonography Transrectal Prostate (URONAV, ULTRASOUND AND TECH, MRI DONE AT  ON 1/9/24, CALL HIS BROTHER JEENLLE FOR CONSENT HIS NUMBER [phone], THE NUMBERS IN HIS CHART ARE FOR HIS FACILITY CALL THEM TO SET UP PAT)    Location: [facility] / Virtual [facility]    Surgeons: Nicolas Villegas MD            Relevant Problems   No relevant active problems       Clinical information reviewed:   Tobacco  Allergies  Meds   Med Hx  Surg Hx   Fam Hx  Soc Hx        NPO Detail:  NPO/Void Status  Date of Last Liquid: 02/22/24  Time of Last Liquid: 2300  Date of Last Solid: 02/22/24  Time of Last Solid: 2300  Time of Last Void: 0500         Physical Exam    Airway  Mallampati: II  TM distance: >3 FB  Neck ROM: full     Cardiovascular   Rhythm: regular  Rate: normal     Dental    Pulmonary   Breath sounds clear to auscultation     Abdominal            Anesthesia Plan    History of general anesthesia?: yes  History of complications of general anesthesia?: no    ASA 2     MAC     intravenous induction   Anesthetic plan and risks discussed with patient.    Plan discussed with CRNA.

## 2024-02-23 NOTE — ANESTHESIA POSTPROCEDURE EVALUATION
Patient: Jaret Turcios    Procedure Summary       Date: 02/23/24 Room / Location: [facility] / [facility]    Anesthesia Start: 0748 Anesthesia Stop: 0834    Procedures:       Biopsy Prostate with Navigation      Ultrasonography Transrectal Prostate (URONAV, ULTRASOUND AND TECH, MRI DONE AT  ON 1/9/24, CALL HIS BROTHER JENELLE FOR CONSENT HIS NUMBER [phone], THE NUMBERS IN HIS CHART ARE FOR HIS FACILITY CALL THEM TO SET UP PAT) Diagnosis:       Elevated PSA      (ELEVATED PSA R97.20)    Surgeons: Nicolas Villegas MD Responsible Provider: Addison Workman MD    Anesthesia Type: MAC ASA Status: 2            Anesthesia Type: MAC    Vitals Value Taken Time   /80 02/23/24 0855   Temp 36.5 °C (97.7 °F) 02/23/24 0855   Pulse 59 02/23/24 0855   Resp 14 02/23/24 0843   SpO2 99 % 02/23/24 0855       Anesthesia Post Evaluation    Patient location during evaluation: bedside  Patient participation: complete - patient participated  Level of consciousness: awake  Pain management: adequate  Multimodal analgesia pain management approach  Airway patency: patent  Cardiovascular status: stable  Respiratory status: spontaneous ventilation and unassisted  Hydration status: acceptable  Postoperative Nausea and Vomiting: none  Comments: No significant PONV.        There were no known notable events for this encounter.

## 2024-02-23 NOTE — PERIOPERATIVE NURSING NOTE
Patient in Phase 2; dressed and up to chair with RN assist. Tolerating po fluids, no complaint of pain and no complaint of nausea.     Family at bedside; discussed discharge instructions with patient and Family. All questions at this time answered.     Patient clinically appropriate for discharge. IV removed and patient transported to discharge area via wheelchair.

## 2024-02-23 NOTE — OP NOTE
Biopsy Prostate with Navigation, Ultrasonography Transrectal Prostate (URONAV, ULTRASOUND AND TECH, MRI DONE AT  ON 24, CALL HIS BROTHER JENELLE FOR CONSENT HIS NUMBER [phone], THE NUMBERS IN HIS CHART ARE FOR HIS FACILITY CALL THEM TO SET UP PAT) Operative Note     Date: 2024  OR Location: [facility]    Name: Jaret Turcios, : 1965, Age: 58 y.o., MRN: [id number], Sex: male    Diagnosis  Pre-op Diagnosis     * Elevated PSA [R97.20] Post-op Diagnosis     * Elevated PSA [R97.20]     Procedures  Biopsy Prostate with Navigation  17436 - FL PROSTATE NEEDLE BIOPSY ANY APPROACH    Ultrasonography Transrectal Prostate (URONAV, ULTRASOUND AND TECH, MRI DONE AT  ON 24, CALL HIS BROTHER JENELLE FOR CONSENT HIS NUMBER [phone], THE NUMBERS IN HIS CHART ARE FOR HIS FACILITY CALL THEM TO SET UP PAT)  76848 - CHG US TRANSRECTAL      Surgeons      * Nicolas Villegas - Primary    Resident/Fellow/Other Assistant:  Surgeon(s) and Role:    Procedure Summary  Anesthesia: Monitor Anesthesia Care  ASA: II  Anesthesia Staff: Anesthesiologist: Addison Workman MD  C-AA: LUCÍA Gibson  Estimated Blood Loss: 5 mL  Intra-op Medications:   Administrations occurring from 0745 to 0830 on 24:   Medication Name Total Dose   cefTRIAXone (Rocephin) 2 g IV in dextrose 5% 50 mL 2 g              Anesthesia Record               Intraprocedure I/O Totals          Intake    Propofol Drip 0.00 mL    The total shown is the total volume documented since Anesthesia Start was filed.    LR infusion 550.00 mL    cefTRIAXone (Rocephin) 2 g IV in dextrose 5% 50 mL 50.00 mL    Total Intake 600 mL          Specimen:   ID Type Source Tests Collected by Time   1 : RIGHT MEDIAL MID Tissue PROSTATE, BIOPSY, RIGHT MEDIAL MID SURGICAL PATHOLOGY EXAM Nicolas Villegas MD 2024 075   2 : RIGHT MEDIAL BASE Tissue PROSTATE, BIOPSY, RIGHT MEDIAL BASE SURGICAL PATHOLOGY EXAM Nicolas Villegas MD 2024 4514   3 : RIGHT  MEDIAL APEX Tissue PROSTATE, BIOPSY, RIGHT MEDIAL APEX SURGICAL PATHOLOGY EXAM Nicolas Villegas MD 2/23/2024 0759   4 : RIGHT LATERAL MID Tissue PROSTATE, BIOPSY, RIGHT LATERAL MID SURGICAL PATHOLOGY EXAM Nicolas Villegas MD 2/23/2024 0759   5 : RIGHT LATERAL BASE Tissue PROSTATE, BIOPSY, RIGHT LATERAL BASE SURGICAL PATHOLOGY EXAM Nicolas Villegas MD 2/23/2024 0759   6 : RIGHT LATERAL APEX Tissue PROSTATE, BIOPSY, RIGHT LATERAL APEX SURGICAL PATHOLOGY EXAM Nicolas Villegas MD 2/23/2024 0759   7 : LEFT MEDIAL MID Tissue PROSTATE, BIOPSY, LEFT MEDIAL MID SURGICAL PATHOLOGY EXAM Nicolas Villegas MD 2/23/2024 0759   8 : LEFT MEDIAL BASE Tissue PROSTATE, BIOPSY, LEFT MEDIAL BASE SURGICAL PATHOLOGY EXAM Nicolas Villegas MD 2/23/2024 0759   9 : LEFT MEDIAL APEX Tissue PROSTATE, BIOPSY, LEFT MEDIAL APEX SURGICAL PATHOLOGY EXAM Nicolas Villegas MD 2/23/2024 0759   10 : LEFT LATERAL APEX Tissue PROSTATE, BIOPSY, LEFT LATERAL APEX SURGICAL PATHOLOGY EXAM Nicolas Villegas MD 2/23/2024 0759   11 : LEFT LATERAL BASE Tissue PROSTATE, BIOPSY, LEFT LATERAL BASE SURGICAL PATHOLOGY EXAM Nicolas Villegas MD 2/23/2024 0759   12 : LEFT LATERAL MID Tissue PROSTATE, BIOPSY, LEFT LATERAL MID SURGICAL PATHOLOGY EXAM Nicolas Villegas MD 2/23/2024 0759   13 : AREA OF INTEREST #2 - RIGHT APEX POSTERIOR Tissue PROSTATE BIOPSY TARGETED JERONIMO SURGICAL PATHOLOGY EXAM Nicolas Villegas MD 2/23/2024 0759   14 : AREA OF INTEREST #1 - RIGHT APEX ANTERIOR Tissue PROSTATE BIOPSY TARGETED JERONIMO SURGICAL PATHOLOGY EXAM Nicolas Villegas MD 2/23/2024 0759        Staff:   Circulator: Liza Boyer RN  Scrub Person: Clayton Obando           Drains and/or Catheters: None    Findings: Biopsies obtained    Indications: Jaret Turcios is an 58 y.o. male who is having surgery for ELEVATED PSA R97.20.  He has history of elevated PSA and MRI identified 2 areas of interest in the right apex, anteriorly and posteriorly.  He presents for MRI fusion guided biopsy.   Risks including infection bleeding reviewed.  His brother is his guardian.  Written consent was obtained.    Procedure Details: Patient was taken to the operating room and given general anesthesia.  He was placed in the left lateral decubitus position and digital rectal exam was negative.  The ultrasound probe was inserted.  Biopsies were obtained from the right and left apex mid gland and base, medially and laterally.  The 2 areas of interest on the right apex, anteriorly and posteriorly, were biopsied.  The patient tolerated the procedure and was transferred to the recovery room in stable condition.    Complications:  None; patient tolerated the procedure well.          Nicolas Villegas  Phone Number: [phone]

## 2024-03-09 LAB
LAB AP ASR DISCLAIMER: NORMAL
LABORATORY COMMENT REPORT: NORMAL
PATH REPORT.FINAL DX SPEC: NORMAL
PATH REPORT.GROSS SPEC: NORMAL
PATH REPORT.RELEVANT HX SPEC: NORMAL
PATH REPORT.TOTAL CANCER: NORMAL

## 2024-05-17 ENCOUNTER — NURSING HOME VISIT (OUTPATIENT)
Dept: POST ACUTE CARE | Facility: EXTERNAL LOCATION | Age: 59
End: 2024-05-17
Payer: MEDICARE

## 2024-05-17 DIAGNOSIS — M79.671 FOOT PAIN, BILATERAL: ICD-10-CM

## 2024-05-17 DIAGNOSIS — E11.9 COMPREHENSIVE DIABETIC FOOT EXAMINATION, TYPE 2 DM, ENCOUNTER FOR (MULTI): Primary | ICD-10-CM

## 2024-05-17 DIAGNOSIS — B35.1 ONYCHOMYCOSIS: ICD-10-CM

## 2024-05-17 DIAGNOSIS — M79.672 FOOT PAIN, BILATERAL: ICD-10-CM

## 2024-05-17 PROCEDURE — 99307 SBSQ NF CARE SF MDM 10: CPT | Performed by: PODIATRIST

## 2024-05-17 NOTE — LETTER
Patient: Jaret Turcios  : 1965    Encounter Date: 2024    Subjective: Patient complains of painful nails and unable to safely trim on their own. Pt is in group home and benefits from foot check every 3-4 mos    Objective: Vascular exam: DP and PT pulses palpable 2/4 b/l.   Capillary refill time less than 3 seconds b/l. + Hair growth noted to digits. No lower extremity swelling noted to b/l legs.   Skin temp warm to warm from proximal to distal b/l.    No Varicosities noted to feet b/l.   Neurologic: Vibratory, protective and proprioception intact b/l.      Musculoskeletal exam: Muscle strength 5/5 for all major muscle groups tested in the lower extremity b/l.     Dermatologic exam: Nails 1-5 b/l are elongated and discolored with the presence of subungual debris. Left 5th nail notes sub ungula - debrided nail and area  Webspaces 1-4 b/l are non macerated.   Hyperkeratotic tissue noted:None  Open wounds noted: None    Assessment: DM type II, fungal nails, PVD, foot pain    Plan:  Patient examined and evaluated.   Nails debrided   Will continue to follow. Call if any concerns.  All questions answered.  Continue current orders      Electronically Signed By: Shannan Lawton DPM   24  7:35 AM

## 2024-05-25 NOTE — PROGRESS NOTES
Subjective: Patient complains of painful nails and unable to safely trim on their own. Pt is in group home and benefits from foot check every 3-4 mos    Objective: Vascular exam: DP and PT pulses palpable 2/4 b/l.   Capillary refill time less than 3 seconds b/l. + Hair growth noted to digits. No lower extremity swelling noted to b/l legs.   Skin temp warm to warm from proximal to distal b/l.    No Varicosities noted to feet b/l.   Neurologic: Vibratory, protective and proprioception intact b/l.      Musculoskeletal exam: Muscle strength 5/5 for all major muscle groups tested in the lower extremity b/l.     Dermatologic exam: Nails 1-5 b/l are elongated and discolored with the presence of subungual debris. Left 5th nail notes sub ungula - debrided nail and area  Webspaces 1-4 b/l are non macerated.   Hyperkeratotic tissue noted:None  Open wounds noted: None    Assessment: DM type II, fungal nails, PVD, foot pain    Plan:  Patient examined and evaluated.   Nails debrided   Will continue to follow. Call if any concerns.  All questions answered.  Continue current orders

## 2024-09-04 ENCOUNTER — NURSING HOME VISIT (OUTPATIENT)
Dept: POST ACUTE CARE | Facility: EXTERNAL LOCATION | Age: 59
End: 2024-09-04
Payer: COMMERCIAL

## 2024-09-04 DIAGNOSIS — M79.672 FOOT PAIN, BILATERAL: ICD-10-CM

## 2024-09-04 DIAGNOSIS — I73.9 PVD (PERIPHERAL VASCULAR DISEASE) (CMS-HCC): ICD-10-CM

## 2024-09-04 DIAGNOSIS — E11.9 COMPREHENSIVE DIABETIC FOOT EXAMINATION, TYPE 2 DM, ENCOUNTER FOR (MULTI): Primary | ICD-10-CM

## 2024-09-04 DIAGNOSIS — B35.1 ONYCHOMYCOSIS: ICD-10-CM

## 2024-09-04 DIAGNOSIS — M79.671 FOOT PAIN, BILATERAL: ICD-10-CM

## 2024-09-04 PROCEDURE — 99307 SBSQ NF CARE SF MDM 10: CPT | Performed by: PODIATRIST

## 2024-09-04 NOTE — LETTER
Patient: Jaret Turcios  : 1965    Encounter Date: 2024    Subjective: Patient complains of painful nails and unable to safely trim on their own. Pt is in group home and benefits from foot check every 3-4 mos    Objective: Vascular exam: DP and PT pulses palpable 2/4 b/l.   Capillary refill time less than 3 seconds b/l. + Hair growth noted to digits. No lower extremity swelling noted to b/l legs.   Skin temp warm to warm from proximal to distal b/l.    No Varicosities noted to feet b/l.   Neurologic: Vibratory, protective and proprioception intact b/l.      Musculoskeletal exam: Muscle strength 5/5 for all major muscle groups tested in the lower extremity b/l.     Dermatologic exam: Nails 1-5 b/l are elongated and discolored with the presence of subungual debris. Left 5th nail notes sub ungula - debrided nail and area  Webspaces 1-4 b/l are non macerated.   Hyperkeratotic tissue noted:None  Open wounds noted: None    Assessment: DM type II, fungal nails, PVD, foot pain    Plan:  Patient examined and evaluated.   Nails debrided   Will continue to follow. Call if any concerns.  All questions answered.  Continue current orders      Electronically Signed By: Shannan Lawton DPM   24  8:00 PM

## 2024-10-03 ENCOUNTER — DOCUMENTATION (OUTPATIENT)
Dept: UROLOGY | Facility: CLINIC | Age: 59
End: 2024-10-03
Payer: MEDICAID

## 2024-10-03 NOTE — PROGRESS NOTES
PSA from [facility] was 3.6.  9/24/2024.  He scheduled for follow-up in November.

## 2024-11-11 ENCOUNTER — APPOINTMENT (OUTPATIENT)
Dept: UROLOGY | Facility: CLINIC | Age: 59
End: 2024-11-11

## 2024-11-11 DIAGNOSIS — R97.20 ELEVATED PSA: ICD-10-CM

## 2024-11-11 DIAGNOSIS — N40.1 BENIGN PROSTATIC HYPERPLASIA WITH LOWER URINARY TRACT SYMPTOMS, SYMPTOM DETAILS UNSPECIFIED: ICD-10-CM

## 2024-11-11 DIAGNOSIS — R39.9 URINARY SYMPTOM OR SIGN: ICD-10-CM

## 2024-11-11 LAB
POC APPEARANCE, URINE: CLEAR
POC BILIRUBIN, URINE: NEGATIVE
POC BLOOD, URINE: NEGATIVE
POC COLOR, URINE: YELLOW
POC GLUCOSE, URINE: ABNORMAL MG/DL
POC KETONES, URINE: NEGATIVE MG/DL
POC LEUKOCYTES, URINE: NEGATIVE
POC NITRITE,URINE: NEGATIVE
POC PH, URINE: 5 PH
POC PROTEIN, URINE: NEGATIVE MG/DL
POC SPECIFIC GRAVITY, URINE: 1.01
POC UROBILINOGEN, URINE: 0.2 EU/DL

## 2024-11-11 PROCEDURE — 99215 OFFICE O/P EST HI 40 MIN: CPT | Performed by: UROLOGY

## 2024-11-11 PROCEDURE — 1036F TOBACCO NON-USER: CPT | Performed by: UROLOGY

## 2024-11-11 PROCEDURE — 81003 URINALYSIS AUTO W/O SCOPE: CPT | Performed by: UROLOGY

## 2024-11-11 PROCEDURE — G2211 COMPLEX E/M VISIT ADD ON: HCPCS | Performed by: UROLOGY

## 2024-11-11 RX ORDER — TAMSULOSIN HYDROCHLORIDE 0.4 MG/1
0.4 CAPSULE ORAL NIGHTLY
Qty: 30 CAPSULE | Refills: 11 | Status: SHIPPED | OUTPATIENT
Start: 2024-11-11 | End: 2025-11-06

## 2024-11-11 RX ORDER — EMPAGLIFLOZIN 25 MG/1
TABLET, FILM COATED ORAL
COMMUNITY
Start: 2024-09-27

## 2024-11-11 RX ORDER — SEMAGLUTIDE 0.68 MG/ML
INJECTION, SOLUTION SUBCUTANEOUS
COMMUNITY
Start: 2024-10-22

## 2024-11-11 ASSESSMENT — PAIN SCALES - GENERAL: PAINLEVEL_OUTOF10: 0-NO PAIN

## 2024-11-11 NOTE — PATIENT INSTRUCTIONS
Please call Radiology scheduling at [phone] prostate MRI at [facility] for Jan 2025    Your provider has ordered blood work to be drawn.  Please obtain your labs at any  facility for Jan 2025

## 2024-11-11 NOTE — PROGRESS NOTES
"  Patient is a 59 y.o. male presenting with elevated PSA and incontinence    SUBJECTIVE:  HPI   PSA from [facility] was 3.6.  9/24/2024.  He has incontinence daily.  Per staff this is likely a behavioral issue as he skips bathroom is to use a bathroom further out.  He had used Myrbetriq a few years ago.  He has no complaints today.  Prostate 3+, no nodules    No results found for: \"URINECULTURE\"     Past Medical History:   Diagnosis Date    Cataract     DM (diabetes mellitus) (Multi)     Intellectual disability     Obstructive sleep apnea on CPAP     ISHAN (obstructive sleep apnea)     Urinary incontinence       Past Surgical History:   Procedure Laterality Date    CHOLECYSTECTOMY        No family history on file.   Social History     Socioeconomic History    Marital status: Single   Tobacco Use    Smoking status: Never    Smokeless tobacco: Never   Substance and Sexual Activity    Alcohol use: Never    Drug use: Never        Review of Systems   Constitutional: denies any unintentional weight loss or change in strength.  Integumentary: denies any rashes or pruritus.  Eyes: denies any double vision or eye pain.  Ear/Nose/Mouth/Throat: denies any nosebleeds or gum bleeds.  Cardiovascular: denies any chest pain or syncope.  Respiratory: denies hemoptysis.  Gastrointestinal: denies nausea or vomiting.  Musculoskeletal: denies muscle cramping or weakness.  Neurologic: denies convulsions or seizures.  Hematologic/Lymphatic: denies bleeding tendencies.  Endocrine: denies heat/cold intolerance.  All other systems have been reviewed and are negative unless otherwise noted in the HPI.    OBJECTIVE:  There were no vitals taken for this visit.  Physical Exam   Constitutional: No obvious distress.  Eyes: Non-injected conjunctiva, sclera clear, EOMI.  Ears/Nose/Mouth/Throat: No obvious drainage per ears or nose.  Cardiovascular: Extremities are warm and well perfused. No edema, cyanosis or " "pallor.  Respiratory: No audible wheezing/stridor; respirations do not appear labored.  Gastrointestinal: Abdomen soft, not distended.  Musculoskeletal: Normal ROM of extremities.  Skin: No obvious rashes or open sores.  Neurologic: Alert and oriented, CN 2-12 grossly intact.  Psychiatric: Answers questions appropriately with normal affect.  Hematologic/Lymphatic/Immunologic: No obvious bruises or sites of spontaneous bleeding.  Genitourinary: No CVA tenderness, bladder not palpable.  Prostate 3+, no nodules.  No testicular masses.  He will repeat a MRI in January 2025    Labs:  Lab Results   Component Value Date    WBC 7.2 02/14/2024    HGB 16.3 02/14/2024    HCT 48.2 02/14/2024     02/14/2024     02/14/2024    K 4.1 02/14/2024     02/14/2024    CREATININE 1.03 02/14/2024    BUN 17 02/14/2024    CO2 32 02/14/2024    HGBA1C 6.3 02/20/2024     No results found for: \"KPSAT\", \"KPSAP\"  IMAGING:        PROCEDURES:     ml    ASSESSMENT/PLAN:  Problem List Items Addressed This Visit    None     He had a history of an elevated PSA.  He had atypia on prostate biopsy  On 2/23/2024 at the right apex and right lateral mid biopsies.  MRI in January 2024 identified a PI-RADS 3 and PI-RADS 4 lesion.      He will repeat a MRI in January 2025.  A PSA will also be repeated in January 2025.    He has a history of urinary urge incontinence.  We discussed his leakage and it appears functional.  Timed voids may be helpful.      He had retention of 606 ml. Creatinine 1.03.  Consider cystoscopy at time of biopsy.  Start tamsulosin.  Track residual.    He will return in 6 months, but will probably be scheduled for a repeat biopsy in early 2025.    All questions were answered to the patient’s satisfaction.  Patient agrees with the plan and wishes to proceed.  Follow-up will be scheduled appropriately.     Nicolas Villegas MD  "

## 2025-01-15 ENCOUNTER — NURSING HOME VISIT (OUTPATIENT)
Dept: POST ACUTE CARE | Facility: EXTERNAL LOCATION | Age: 60
End: 2025-01-15
Payer: MEDICAID

## 2025-01-15 DIAGNOSIS — E11.9 COMPREHENSIVE DIABETIC FOOT EXAMINATION, TYPE 2 DM, ENCOUNTER FOR (MULTI): Primary | ICD-10-CM

## 2025-01-15 DIAGNOSIS — B35.1 ONYCHOMYCOSIS: ICD-10-CM

## 2025-01-15 DIAGNOSIS — I73.9 PVD (PERIPHERAL VASCULAR DISEASE) (CMS-HCC): ICD-10-CM

## 2025-01-15 DIAGNOSIS — M79.672 FOOT PAIN, BILATERAL: ICD-10-CM

## 2025-01-15 DIAGNOSIS — M79.671 FOOT PAIN, BILATERAL: ICD-10-CM

## 2025-01-15 NOTE — LETTER
Patient: Jaret uTrcios  : 1965    Encounter Date: 01/15/2025    Subjective: Patient complains of painful nails and unable to safely trim on their own. Pt is in group home and benefits from foot check every 3-4 mos    Objective: Vascular exam: DP and PT pulses palpable 2/4 b/l.   Capillary refill time less than 3 seconds b/l. + Hair growth noted to digits. No lower extremity swelling noted to b/l legs.   Skin temp warm to warm from proximal to distal b/l.    No Varicosities noted to feet b/l.   Neurologic: Vibratory, protective and proprioception intact b/l.      Musculoskeletal exam: Muscle strength 5/5 for all major muscle groups tested in the lower extremity b/l.     Dermatologic exam: Nails 1-5 b/l are elongated and discolored with the presence of subungual debris. Left 5th nail notes sub ungula - debrided nail and area  Webspaces 1-4 b/l are non macerated.   Hyperkeratotic tissue noted:None  Open wounds noted: None    Assessment: DM type II, fungal nails, PVD, foot pain    Plan:  Patient examined and evaluated.   Nails debrided   Will continue to follow. Call if any concerns.  All questions answered.  Continue current orders      Electronically Signed By: Shannan Lawton DPM   25  1:41 PM

## 2025-01-20 ENCOUNTER — HOSPITAL ENCOUNTER (OUTPATIENT)
Dept: RADIOLOGY | Facility: HOSPITAL | Age: 60
Discharge: HOME | End: 2025-01-20
Payer: MEDICARE

## 2025-01-20 DIAGNOSIS — R97.20 ELEVATED PSA: ICD-10-CM

## 2025-01-20 PROCEDURE — 72197 MRI PELVIS W/O & W/DYE: CPT | Performed by: RADIOLOGY

## 2025-01-20 PROCEDURE — 2550000001 HC RX 255 CONTRASTS: Performed by: UROLOGY

## 2025-01-20 PROCEDURE — A9575 INJ GADOTERATE MEGLUMI 0.1ML: HCPCS | Performed by: UROLOGY

## 2025-01-20 PROCEDURE — 76498 UNLISTED MR PROCEDURE: CPT

## 2025-01-20 PROCEDURE — 72197 MRI PELVIS W/O & W/DYE: CPT

## 2025-01-20 RX ORDER — GADOTERATE MEGLUMINE 376.9 MG/ML
20 INJECTION INTRAVENOUS
Status: COMPLETED | OUTPATIENT
Start: 2025-01-20 | End: 2025-01-20

## 2025-01-20 RX ADMIN — GADOTERATE MEGLUMINE 20 ML: 376.9 INJECTION INTRAVENOUS at 13:25

## 2025-01-30 DIAGNOSIS — R97.20 ELEVATED PSA: Primary | ICD-10-CM

## 2025-03-12 ENCOUNTER — OFFICE VISIT (OUTPATIENT)
Dept: PODIATRY | Facility: CLINIC | Age: 60
End: 2025-03-12
Payer: MEDICARE

## 2025-03-12 ENCOUNTER — HOSPITAL ENCOUNTER (OUTPATIENT)
Dept: RADIOLOGY | Facility: HOSPITAL | Age: 60
Discharge: HOME | End: 2025-03-12
Payer: MEDICARE

## 2025-03-12 DIAGNOSIS — M79.671 RIGHT FOOT PAIN: ICD-10-CM

## 2025-03-12 DIAGNOSIS — M79.671 RIGHT FOOT PAIN: Primary | ICD-10-CM

## 2025-03-12 PROCEDURE — 99214 OFFICE O/P EST MOD 30 MIN: CPT | Performed by: PODIATRIST

## 2025-03-12 PROCEDURE — 1036F TOBACCO NON-USER: CPT | Performed by: PODIATRIST

## 2025-03-12 PROCEDURE — 73630 X-RAY EXAM OF FOOT: CPT | Mod: RT

## 2025-03-12 NOTE — PROGRESS NOTES
History of Present Illness:   Patient states they are here for Dm exam  Denies NTB to feet  Most recent A1C is 6.2. Feb 2025    Resident nurse called asking for patient to be seen  Noticed darkened skin, patient states area is tender    Was asked to be added on to eval    Pt known to office      Past Medical History  Past Medical History:   Diagnosis Date    Cataract     DM (diabetes mellitus) (Multi)     Intellectual disability     Obstructive sleep apnea on CPAP     ISHAN (obstructive sleep apnea)     Urinary incontinence        Medications and Allergies have been reviewed.    Review Of Systems:  GENERAL: No weight loss, malaise or fevers.  HEENT: Negative for frequent or significant headaches,   RESPIRATORY: Negative for cough, wheezing or shortness of breath.  CARDIOVASCULAR: Negative for chest pain, leg swelling or palpitations.    Examination of Both Lower Extremities:   Objective:   Vasc: DP and PT pulses are palpable bilateral.  CFT is less than 3 seconds bilateral.  Skin temperature is warm to cool proximal to distal bilateral.  Varicosites noted.     Neuro: Gross protective sensation intact.     Derm: Nails 1-5 bilateral are intact.  Right lat 5th met base notes hpk tissue. Debrided area. nO open wound noted. Keep filed down    Ortho: Muscle strength is 5/5 for all pedal groups tested.     1. Right foot pain  XR foot right 3+ views        Patient exam and eval  Debrided area  A1C revd  Ordered xray to eval bone structure   Potential cause of foot pain  Recommend stiff shoes  Will continue to follow up with patient  Discussed with Sydney, She was agreement with plan  Patient was in agreement to this plan. All questions answered.      Shannan Lawton DPM  [phone]  Option [address]  Fax: [phone]

## 2025-04-30 DIAGNOSIS — R97.20 ELEVATED PSA: ICD-10-CM

## 2025-05-07 ENCOUNTER — NURSING HOME VISIT (OUTPATIENT)
Dept: POST ACUTE CARE | Facility: EXTERNAL LOCATION | Age: 60
End: 2025-05-07
Payer: MEDICARE

## 2025-05-07 DIAGNOSIS — E11.9 COMPREHENSIVE DIABETIC FOOT EXAMINATION, TYPE 2 DM, ENCOUNTER FOR (MULTI): Primary | ICD-10-CM

## 2025-05-07 DIAGNOSIS — M79.671 FOOT PAIN, BILATERAL: ICD-10-CM

## 2025-05-07 DIAGNOSIS — B35.1 ONYCHOMYCOSIS: ICD-10-CM

## 2025-05-07 DIAGNOSIS — M79.672 FOOT PAIN, BILATERAL: ICD-10-CM

## 2025-05-07 DIAGNOSIS — I73.9 PVD (PERIPHERAL VASCULAR DISEASE): ICD-10-CM

## 2025-05-07 NOTE — LETTER
Patient: Jaret Turcios  : 1965    Encounter Date: 2025    Subjective: Patient complains of painful nails and unable to safely trim on their own. Pt is in group home and benefits from foot check every 3-4 mos    Objective: Vascular exam: DP and PT pulses palpable 2/4 b/l.   Capillary refill time less than 3 seconds b/l. + Hair growth noted to digits. No lower extremity swelling noted to b/l legs.   Skin temp warm to warm from proximal to distal b/l.    No Varicosities noted to feet b/l.   Neurologic: Vibratory, protective and proprioception intact b/l.      Musculoskeletal exam: Muscle strength 5/5 for all major muscle groups tested in the lower extremity b/l.     Dermatologic exam: Nails 1-5 b/l are elongated and discolored with the presence of subungual debris.   Webspaces 1-4 b/l are non macerated.   Hyperkeratotic tissue noted: r sub 5th met base. No need to debride hpk at this time.   Open wounds noted: None    Assessment: DM type II, fungal nails, PVD, foot pain    Plan:  Patient examined and evaluated.   Nails debrided   Will continue to follow. Call if any concerns.  All questions answered.  Continue current orders    Electronically Signed By: Shannan Lawton DPM   25 10:57 AM

## 2025-05-11 NOTE — PROGRESS NOTES
Subjective: Patient complains of painful nails and unable to safely trim on their own. Pt is in group home and benefits from foot check every 3-4 mos    Objective: Vascular exam: DP and PT pulses palpable 2/4 b/l.   Capillary refill time less than 3 seconds b/l. + Hair growth noted to digits. No lower extremity swelling noted to b/l legs.   Skin temp warm to warm from proximal to distal b/l.    No Varicosities noted to feet b/l.   Neurologic: Vibratory, protective and proprioception intact b/l.      Musculoskeletal exam: Muscle strength 5/5 for all major muscle groups tested in the lower extremity b/l.     Dermatologic exam: Nails 1-5 b/l are elongated and discolored with the presence of subungual debris.   Webspaces 1-4 b/l are non macerated.   Hyperkeratotic tissue noted: r sub 5th met base. No need to debride hpk at this time.   Open wounds noted: None    Assessment: DM type II, fungal nails, PVD, foot pain    Plan:  Patient examined and evaluated.   Nails debrided   Will continue to follow. Call if any concerns.  All questions answered.  Continue current orders

## 2025-05-12 ENCOUNTER — APPOINTMENT (OUTPATIENT)
Dept: UROLOGY | Facility: CLINIC | Age: 60
End: 2025-05-12
Payer: MEDICARE

## 2025-06-29 NOTE — PROGRESS NOTES
Patient is a 59 y.o. male presenting for followup with [facility] of elevated PSA, urinary retention and incontinence    SUBJECTIVE:  HPI   He presents for 6-month followup.  His last PSA was 4.43 in February 2025.  He states he feels well today and has no urinary complaints.  He has had an improvement to his incontinence with a bathroom closer to him at his residence.    Review of Systems   All systems have been reviewed and are negative unless otherwise noted in the HPI.    OBJECTIVE:  There were no vitals taken for this visit.  Physical Exam   Constitutional: No obvious distress.  Cardiovascular: Extremities are warm and well perfused.  Respiratory: No audible wheezing/stridor; respirations do not appear labored.  Neurologic: Alert and oriented x3.  Genitourinary: No CVA tenderness, bladder not palpable.     Labs:  Lab Results   Component Value Date    WBC 7.2 02/14/2024    HGB 16.3 02/14/2024    HCT 48.2 02/14/2024    MCV 94 02/14/2024     02/14/2024    GLUCOSE 88 02/14/2024    CALCIUM 9.6 02/14/2024     02/14/2024    K 4.1 02/14/2024    CO2 32 02/14/2024     02/14/2024    BUN 17 02/14/2024    CREATININE 1.03 02/14/2024    EGFR 84 02/14/2024   SURGICAL PATHOLOGY:  Prostate biopsy: 02/23/24  FINAL DIAGNOSIS   A.  Prostate, right medial mid, biopsy:  -- BENIGN PROSTATIC TISSUE.     B.  Prostate, right medial base, biopsy:  -- BENIGN PROSTATIC TISSUE.     C.  Prostate, right medial apex, biopsy:  -- BENIGN PROSTATIC TISSUE.     D.  Prostate, right lateral mid, biopsy:  --ATYPICAL SMALL ACINAR PROLIFERATION (SEE NOTE):     Note: Although suspicious, there is insufficient cytologic and/or architectural atypia to establish a definitive diagnosis of adenocarcinoma. Repeat biopsy may be helpful, if clinically indicated.     E.  Prostate, right lateral base, biopsy:  -- BENIGN PROSTATIC TISSUE.     F.  Prostate, right lateral apex, biopsy:  -- BENIGN PROSTATIC TISSUE.     G.  Prostate, right medial mid,  biopsy:  -- BENIGN PROSTATIC TISSUE.     H.  Prostate, left medial base, biopsy:  -- BENIGN PROSTATIC TISSUE.     I.  Prostate, left medial apex, biopsy:  -- BENIGN PROSTATIC TISSUE.     J.  Prostate, left lateral apex, biopsy:  -- BENIGN PROSTATIC TISSUE.     K.  Prostate, left lateral base, biopsy:  -- BENIGN PROSTATIC TISSUE.     L.  Prostate, left lateral mid, biopsy:  -- BENIGN PROSTATIC TISSUE.     M.  Prostate, area of interest #2-right apex posterior, biopsy:  --ATYPICAL SMALL ACINAR PROLIFERATION (SEE NOTE):     Note: Although suspicious, there is insufficient cytologic and/or architectural atypia to establish a definitive diagnosis of adenocarcinoma. Repeat biopsy may be helpful, if clinically indicated.     N.  Prostate, area of interest #1-right apex anterior, biopsy:  -- BENIGN PROSTATIC TISSUE.       IMAGING:  === 01/20/25 ===  MR PROSTATE WITH JERONIMO BOUNDARIES IF PIRADS 3 OR ABOVE  - Impression -  Findings of benign prostatic hyperplasia without evidence of  clinically significant neoplasm.    PROCEDURES:  PVR: 412 mL (6/30/25)    ASSESSMENT/PLAN:  Problem List Items Addressed This Visit    None  Visit Diagnoses         Elevated PSA    -  Primary      Urinary symptom or sign        Relevant Orders    Measure post void residual (Completed)    POCT UA Automated manually resulted (Completed)      Prophylactic antibiotic          Benign prostatic hyperplasia with lower urinary tract symptoms, symptom details unspecified          Urinary retention               He had a history of an elevated PSA. MRI in January 2024 identified a PI-RADS 3 and PI-RADS 4 lesion. MRI in January 2025 was negative (PI-RADS 2). He had atypia on prostate biopsy in February 2024 at the right apex and right lateral mid.      I would recommend a repeat in-office prostate biopsy. Risks of infection and bleeding reviewed. He is aware he may experience blood in the urine, semen or stool following the procedure.    He voiced  understanding to hold NSAID's for 1 week and anticoagulants for 3 days prior to his procedure.   ==Nursing staff should administer a Fleet's enema the morning of his procedure==  Cipro will be sent to the pharmacy.    PSA summary  02/25/2025 4.43  01/21/2025 3.9  09/24/2024 3.6  06/25/2024 3.74  11/29/2023 3.58  08/29/2023 3.32    He has a history of urinary urge incontinence.  We discussed his leakage and it appears functional.  Timed voids may be helpful.  He appears to have had some improvement    He has retention with a PVR of 412 mL today (6/30/25). Creatinine 1.03 in 2/25. He continued tamuslosin.  He will be scheduled for cystoscopy at the time of the prostate biopsy.     All questions were answered to the patient’s satisfaction.  Patient agrees with the plan and wishes to proceed.  Follow-up will be scheduled appropriately.     Scribe Attestation  By signing my name below, I, Ileana Hernandez,   attest that this documentation has been prepared under the direction and in the presence of Nicolas Villegas MD.

## 2025-06-30 ENCOUNTER — APPOINTMENT (OUTPATIENT)
Dept: UROLOGY | Facility: CLINIC | Age: 60
End: 2025-06-30
Payer: MEDICARE

## 2025-06-30 DIAGNOSIS — Z79.2 PROPHYLACTIC ANTIBIOTIC: ICD-10-CM

## 2025-06-30 DIAGNOSIS — N40.1 BENIGN PROSTATIC HYPERPLASIA WITH LOWER URINARY TRACT SYMPTOMS, SYMPTOM DETAILS UNSPECIFIED: ICD-10-CM

## 2025-06-30 DIAGNOSIS — R97.20 ELEVATED PSA: Primary | ICD-10-CM

## 2025-06-30 DIAGNOSIS — R39.12 BENIGN PROSTATIC HYPERPLASIA WITH WEAK URINARY STREAM: ICD-10-CM

## 2025-06-30 DIAGNOSIS — R33.9 URINARY RETENTION: ICD-10-CM

## 2025-06-30 DIAGNOSIS — R39.15 URINARY URGENCY: ICD-10-CM

## 2025-06-30 DIAGNOSIS — N40.1 BENIGN PROSTATIC HYPERPLASIA WITH WEAK URINARY STREAM: ICD-10-CM

## 2025-06-30 DIAGNOSIS — R39.9 URINARY SYMPTOM OR SIGN: ICD-10-CM

## 2025-06-30 PROCEDURE — G2211 COMPLEX E/M VISIT ADD ON: HCPCS | Performed by: UROLOGY

## 2025-06-30 PROCEDURE — 1036F TOBACCO NON-USER: CPT | Performed by: UROLOGY

## 2025-06-30 PROCEDURE — 81003 URINALYSIS AUTO W/O SCOPE: CPT | Performed by: UROLOGY

## 2025-06-30 PROCEDURE — 99215 OFFICE O/P EST HI 40 MIN: CPT | Performed by: UROLOGY

## 2025-06-30 RX ORDER — CIPROFLOXACIN 500 MG/1
TABLET, FILM COATED ORAL
Qty: 4 TABLET | Refills: 0 | Status: SHIPPED | OUTPATIENT
Start: 2025-06-30

## 2025-06-30 RX ORDER — DULAGLUTIDE 0.75 MG/.5ML
INJECTION, SOLUTION SUBCUTANEOUS
COMMUNITY
Start: 2025-03-12

## 2025-06-30 ASSESSMENT — PAIN SCALES - GENERAL: PAINLEVEL_OUTOF10: 0-NO PAIN

## 2025-08-08 ENCOUNTER — TELEPHONE (OUTPATIENT)
Dept: UROLOGY | Facility: CLINIC | Age: 60
End: 2025-08-08
Payer: MEDICAID

## 2025-08-18 ENCOUNTER — APPOINTMENT (OUTPATIENT)
Dept: UROLOGY | Facility: CLINIC | Age: 60
End: 2025-08-18
Payer: MEDICARE

## 2025-08-18 VITALS
SYSTOLIC BLOOD PRESSURE: 165 MMHG | BODY MASS INDEX: 30.16 KG/M2 | WEIGHT: 235 LBS | HEART RATE: 70 BPM | TEMPERATURE: 97.5 F | HEIGHT: 74 IN | DIASTOLIC BLOOD PRESSURE: 79 MMHG

## 2025-08-18 DIAGNOSIS — R39.9 URINARY SYMPTOM OR SIGN: ICD-10-CM

## 2025-08-18 DIAGNOSIS — R97.20 ELEVATED PSA: Primary | ICD-10-CM

## 2025-08-18 DIAGNOSIS — R33.9 URINARY RETENTION: ICD-10-CM

## 2025-08-18 PROCEDURE — 76942 ECHO GUIDE FOR BIOPSY: CPT | Performed by: UROLOGY

## 2025-08-18 PROCEDURE — 52000 CYSTOURETHROSCOPY: CPT | Performed by: UROLOGY

## 2025-08-18 PROCEDURE — 76872 US TRANSRECTAL: CPT | Performed by: UROLOGY

## 2025-08-18 PROCEDURE — 81003 URINALYSIS AUTO W/O SCOPE: CPT | Performed by: UROLOGY

## 2025-08-18 PROCEDURE — G0416 PROSTATE BIOPSY, ANY MTHD: HCPCS

## 2025-08-18 PROCEDURE — G0416 PROSTATE BIOPSY, ANY MTHD: HCPCS | Performed by: PATHOLOGY

## 2025-08-18 PROCEDURE — 55700 PR PROSTATE NEEDLE BIOPSY ANY APPROACH: CPT | Performed by: UROLOGY

## 2025-08-18 ASSESSMENT — PAIN SCALES - GENERAL: PAINLEVEL_OUTOF10: 0-NO PAIN

## 2025-08-26 LAB
LABORATORY COMMENT REPORT: NORMAL
PATH REPORT.FINAL DX SPEC: NORMAL
PATH REPORT.GROSS SPEC: NORMAL
PATH REPORT.RELEVANT HX SPEC: NORMAL
PATH REPORT.TOTAL CANCER: NORMAL

## 2025-09-22 ENCOUNTER — APPOINTMENT (OUTPATIENT)
Dept: UROLOGY | Facility: CLINIC | Age: 60
End: 2025-09-22
Payer: MEDICARE

## 2025-11-03 ENCOUNTER — APPOINTMENT (OUTPATIENT)
Dept: UROLOGY | Facility: CLINIC | Age: 60
End: 2025-11-03
Payer: MEDICARE

## (undated) DEVICE — GLOVE, PROTEXIS PI CLASSIC, SZ-7.5, PF, LF

## (undated) DEVICE — DRESSING, NON-ADHERENT, CURAD, ABSORBENT, 3 X 8 IN, STERILE

## (undated) DEVICE — SOLUTION, IRRIGATION, X RX SODIUM CHL 0.9%, 1000ML BTL

## (undated) DEVICE — GOWN, SURGICAL, SIRUS, NON REINFORCED, LARGE

## (undated) DEVICE — NEEDLE, BIOPSY, MAX CORE, 18G

## (undated) DEVICE — Device